# Patient Record
Sex: MALE | URBAN - METROPOLITAN AREA
[De-identification: names, ages, dates, MRNs, and addresses within clinical notes are randomized per-mention and may not be internally consistent; named-entity substitution may affect disease eponyms.]

---

## 2018-10-08 ENCOUNTER — TELEPHONE (OUTPATIENT)
Dept: FAMILY MEDICINE | Facility: OTHER | Age: 48
End: 2018-10-08

## 2018-10-08 DIAGNOSIS — Z20.818 PERTUSSIS EXPOSURE: Primary | ICD-10-CM

## 2018-10-08 RX ORDER — AZITHROMYCIN 250 MG/1
TABLET, FILM COATED ORAL
Qty: 6 TABLET | Refills: 0 | Status: SHIPPED | OUTPATIENT
Start: 2018-10-08 | End: 2023-07-19

## 2018-10-08 NOTE — TELEPHONE ENCOUNTER
Son with positive pertussis.   VOR[facility] per Dr. Ferraro, Azithromycin 500 mg x 1 day, 250 mg day 2-5.   Spouse informed.     Cherrie Nguyễn, RN, BSN

## 2023-07-19 ENCOUNTER — OFFICE VISIT (OUTPATIENT)
Dept: OTOLARYNGOLOGY | Facility: CLINIC | Age: 53
End: 2023-07-19

## 2023-07-19 VITALS — DIASTOLIC BLOOD PRESSURE: 94 MMHG | SYSTOLIC BLOOD PRESSURE: 127 MMHG | HEART RATE: 78 BPM

## 2023-07-19 DIAGNOSIS — M26.629 TMJ PAIN DYSFUNCTION SYNDROME: ICD-10-CM

## 2023-07-19 DIAGNOSIS — K11.8 PAROTID GLAND FULLNESS: Primary | ICD-10-CM

## 2023-07-19 PROCEDURE — 99204 OFFICE O/P NEW MOD 45 MIN: CPT | Mod: 25 | Performed by: OTOLARYNGOLOGY

## 2023-07-19 PROCEDURE — 31575 DIAGNOSTIC LARYNGOSCOPY: CPT | Performed by: OTOLARYNGOLOGY

## 2023-07-19 RX ORDER — CYCLOBENZAPRINE HCL 5 MG
5 TABLET ORAL 3 TIMES DAILY PRN
Qty: 30 TABLET | Refills: 0 | Status: SHIPPED | OUTPATIENT
Start: 2023-07-19

## 2023-07-19 ASSESSMENT — ENCOUNTER SYMPTOMS
HEADACHES: 0
DOUBLE VISION: 0
VOMITING: 0
BLURRED VISION: 0
BRUISES/BLEEDS EASILY: 0
DIZZINESS: 0
SPUTUM PRODUCTION: 0
HEARTBURN: 0
COUGH: 0
SHORTNESS OF BREATH: 0
HEMOPTYSIS: 0
TINGLING: 0
CONSTITUTIONAL NEGATIVE: 1
NAUSEA: 0
PHOTOPHOBIA: 0

## 2023-07-19 NOTE — PROGRESS NOTES
Doroteo Chang's chief complaint for this visit includes:  Chief Complaint   Patient presents with     Consult     Bilat lumps on both sides of neck, below the ears. Pain swelling and pressure. Onset 9 weeks. Lumps and pain moves.   Had a burn on the left arm which turned into staph infection, was on antibiotics and drips in the ER, swelling in both arms. Still having having issues with them. He believes the lumps in neck are secondary to the infection in arm.      PCP: System, Provider Not In    Referring Provider:  Referred Self, MD  No address on file    BP (!) 127/94   Pulse 78

## 2023-07-19 NOTE — PROGRESS NOTES
HPI  Doroteo Chang's chief complaint for this visit includes:  Chief Complaint   Patient presents with     Consult     Bilat lumps on both sides of neck, below the ears. Pain swelling and pressure. Onset 9 weeks. Lumps and pain moves.   Had a burn on the left arm which turned into staph infection, was on antibiotics and drips in the ER, swelling in both arms. Still having having issues with them. He believes the lumps in neck are secondary to the infection in arm.      PCP: System, Provider Not In    Referring Provider:  Referred Self, MD  No address on file    BP (!) 127/94   Pulse 78       This pleasant patient is having pain and pressure on both sides of neck, just front of the ears for weeks. Feels that his neck is bilaterally swollen. He feels pain and discomfort when he opens up his jaw and during chewing. Denies any dysphagia, odynophagia, hoarseness, weight changes, reflux, post nasal drip, facial pressure, or coughing. He has seasonal allergies.     Review of Systems   Constitutional: Negative.    HENT: Negative.    Eyes: Negative for blurred vision, double vision and photophobia.   Respiratory: Negative for cough, hemoptysis, sputum production and shortness of breath.    Gastrointestinal: Negative for heartburn, nausea and vomiting.   Skin: Negative.    Neurological: Negative for dizziness, tingling and headaches.   Endo/Heme/Allergies: Negative for environmental allergies. Does not bruise/bleed easily.         Physical Exam  Vitals and nursing note reviewed.   Constitutional:       Appearance: Normal appearance.   HENT:      Head: Normocephalic and atraumatic.      Jaw: Trismus, tenderness and pain on movement present.      Right Ear: Tympanic membrane, ear canal and external ear normal. No middle ear effusion. There is no impacted cerumen.      Left Ear: Tympanic membrane, ear canal and external ear normal.  No middle ear effusion.      Nose: Nose normal. No mucosal edema, congestion or rhinorrhea.       Right Turbinates: Not enlarged or swollen.      Left Turbinates: Not enlarged or swollen.      Mouth/Throat:      Mouth: Mucous membranes are moist.      Tongue: No lesions. Tongue does not deviate from midline.      Pharynx: Oropharynx is clear. Uvula midline.      Tonsils: No tonsillar exudate or tonsillar abscesses.   Eyes:      Extraocular Movements: Extraocular movements intact.      Pupils: Pupils are equal, round, and reactive to light.   Neurological:      Mental Status: He is alert.       Diagnostic nasal endoscopy:    He was seen in the room and identified. Pros and cons of the procedure were explained to the patient. The procedure and its alternatives were explained to the patient in lay terms. His questions were answered. His symptoms required a diagnostic endoscopic evaluation under local anesthesia. After obtaining an informed consent from the patient, 3% Lidocaine with 1: 100.000 epinephrine spray was applied to each nostril. Then a flexible scopic exam was performed. Septum is in the midline. Ostiomeatal complexes are free of disease. No pus or polyp seen. Inferior turbinates are enlarged. Nasopharynx is normal. Rosenmüller fossa and torus tubarius are normal. Epiglottis, hypopharynx, false vocal folds are normal. No pooling in pyriform fossae. Vocal cords are mobile and normal. He tolerated the procedure well and left the room with no complications.    A/P  Left preauricular region is homogenously enlarged. No discomfort with bimanual palpation. He is having TMJ dysfunction and options were discussed. I will request a parotid US to r/o any sialolithiasis and refer him to his TMJ specialist. His questions were answered.

## 2023-07-19 NOTE — LETTER
7/19/2023         RE: Doroteo Chang  [address]  [place] [address]        Dear Colleague,    Thank you for referring your patient, Doroteo Chang, to the [facility]. Please see a copy of my visit note below.    HPI  Doroteo Chang's chief complaint for this visit includes:  Chief Complaint   Patient presents with     Consult     Bilat lumps on both sides of neck, below the ears. Pain swelling and pressure. Onset 9 weeks. Lumps and pain moves.   Had a burn on the left arm which turned into staph infection, was on antibiotics and drips in the ER, swelling in both arms. Still having having issues with them. He believes the lumps in neck are secondary to the infection in arm.      PCP: System, Provider Not In    Referring Provider:  Referred Self, MD  No address on file    BP (!) 127/94   Pulse 78       This pleasant patient is having pain and pressure on both sides of neck, just front of the ears for weeks. Feels that his neck is bilaterally swollen. He feels pain and discomfort when he opens up his jaw and during chewing. Denies any dysphagia, odynophagia, hoarseness, weight changes, reflux, post nasal drip, facial pressure, or coughing. He has seasonal allergies.     Review of Systems   Constitutional: Negative.    HENT: Negative.    Eyes: Negative for blurred vision, double vision and photophobia.   Respiratory: Negative for cough, hemoptysis, sputum production and shortness of breath.    Gastrointestinal: Negative for heartburn, nausea and vomiting.   Skin: Negative.    Neurological: Negative for dizziness, tingling and headaches.   Endo/Heme/Allergies: Negative for environmental allergies. Does not bruise/bleed easily.         Physical Exam  Vitals and nursing note reviewed.   Constitutional:       Appearance: Normal appearance.   HENT:      Head: Normocephalic and atraumatic.      Jaw: Trismus, tenderness and pain on movement present.      Right Ear: Tympanic membrane, ear canal  and external ear normal. No middle ear effusion. There is no impacted cerumen.      Left Ear: Tympanic membrane, ear canal and external ear normal.  No middle ear effusion.      Nose: Nose normal. No mucosal edema, congestion or rhinorrhea.      Right Turbinates: Not enlarged or swollen.      Left Turbinates: Not enlarged or swollen.      Mouth/Throat:      Mouth: Mucous membranes are moist.      Tongue: No lesions. Tongue does not deviate from midline.      Pharynx: Oropharynx is clear. Uvula midline.      Tonsils: No tonsillar exudate or tonsillar abscesses.   Eyes:      Extraocular Movements: Extraocular movements intact.      Pupils: Pupils are equal, round, and reactive to light.   Neurological:      Mental Status: He is alert.       Diagnostic nasal endoscopy:    He was seen in the room and identified. Pros and cons of the procedure were explained to the patient. The procedure and its alternatives were explained to the patient in lay terms. His questions were answered. His symptoms required a diagnostic endoscopic evaluation under local anesthesia. After obtaining an informed consent from the patient, 3% Lidocaine with 1: 100.000 epinephrine spray was applied to each nostril. Then a flexible scopic exam was performed. Septum is in the midline. Ostiomeatal complexes are free of disease. No pus or polyp seen. Inferior turbinates are enlarged. Nasopharynx is normal. Rosenmüller fossa and torus tubarius are normal. Epiglottis, hypopharynx, false vocal folds are normal. No pooling in pyriform fossae. Vocal cords are mobile and normal. He tolerated the procedure well and left the room with no complications.    A/P  Left preauricular region is homogenously enlarged. No discomfort with bimanual palpation. He is having TMJ dysfunction and options were discussed. I will request a parotid US to r/o any sialolithiasis and refer him to his TMJ specialist. His questions were answered.      Doroteo Chang's chief complaint for  this visit includes:  Chief Complaint   Patient presents with     Consult     Bilat lumps on both sides of neck, below the ears. Pain swelling and pressure. Onset 9 weeks. Lumps and pain moves.   Had a burn on the left arm which turned into staph infection, was on antibiotics and drips in the ER, swelling in both arms. Still having having issues with them. He believes the lumps in neck are secondary to the infection in arm.      PCP: System, Provider Not In    Referring Provider:  Referred Self, MD  No address on file    BP (!) 127/94   Pulse 78           Again, thank you for allowing me to participate in the care of your patient.        Sincerely,        Salud Esparza MD